# Patient Record
Sex: MALE | Race: WHITE | NOT HISPANIC OR LATINO | Employment: FULL TIME | URBAN - METROPOLITAN AREA
[De-identification: names, ages, dates, MRNs, and addresses within clinical notes are randomized per-mention and may not be internally consistent; named-entity substitution may affect disease eponyms.]

---

## 2017-05-04 ENCOUNTER — ALLSCRIPTS OFFICE VISIT (OUTPATIENT)
Dept: OTHER | Facility: OTHER | Age: 52
End: 2017-05-04

## 2017-05-04 DIAGNOSIS — M25.511 PAIN IN RIGHT SHOULDER: ICD-10-CM

## 2017-05-11 ENCOUNTER — APPOINTMENT (OUTPATIENT)
Dept: PHYSICAL THERAPY | Facility: CLINIC | Age: 52
End: 2017-05-11
Payer: COMMERCIAL

## 2017-05-11 DIAGNOSIS — M25.511 PAIN IN RIGHT SHOULDER: ICD-10-CM

## 2017-05-11 PROCEDURE — 97162 PT EVAL MOD COMPLEX 30 MIN: CPT

## 2017-05-16 ENCOUNTER — APPOINTMENT (OUTPATIENT)
Dept: PHYSICAL THERAPY | Facility: CLINIC | Age: 52
End: 2017-05-16
Payer: COMMERCIAL

## 2017-05-16 PROCEDURE — 97140 MANUAL THERAPY 1/> REGIONS: CPT

## 2017-05-18 ENCOUNTER — APPOINTMENT (OUTPATIENT)
Dept: PHYSICAL THERAPY | Facility: CLINIC | Age: 52
End: 2017-05-18
Payer: COMMERCIAL

## 2017-05-18 PROCEDURE — 97140 MANUAL THERAPY 1/> REGIONS: CPT

## 2017-05-23 ENCOUNTER — APPOINTMENT (OUTPATIENT)
Dept: PHYSICAL THERAPY | Facility: CLINIC | Age: 52
End: 2017-05-23
Payer: COMMERCIAL

## 2017-05-23 PROCEDURE — 97140 MANUAL THERAPY 1/> REGIONS: CPT

## 2017-05-25 ENCOUNTER — APPOINTMENT (OUTPATIENT)
Dept: PHYSICAL THERAPY | Facility: CLINIC | Age: 52
End: 2017-05-25
Payer: COMMERCIAL

## 2017-05-25 PROCEDURE — 97140 MANUAL THERAPY 1/> REGIONS: CPT

## 2017-05-30 ENCOUNTER — APPOINTMENT (OUTPATIENT)
Dept: PHYSICAL THERAPY | Facility: CLINIC | Age: 52
End: 2017-05-30
Payer: COMMERCIAL

## 2017-05-30 PROCEDURE — 97140 MANUAL THERAPY 1/> REGIONS: CPT

## 2017-06-01 ENCOUNTER — APPOINTMENT (OUTPATIENT)
Dept: PHYSICAL THERAPY | Facility: CLINIC | Age: 52
End: 2017-06-01
Payer: COMMERCIAL

## 2017-06-01 PROCEDURE — 97140 MANUAL THERAPY 1/> REGIONS: CPT

## 2017-06-06 ENCOUNTER — APPOINTMENT (OUTPATIENT)
Dept: PHYSICAL THERAPY | Facility: CLINIC | Age: 52
End: 2017-06-06
Payer: COMMERCIAL

## 2017-06-06 PROCEDURE — 97140 MANUAL THERAPY 1/> REGIONS: CPT

## 2017-06-08 ENCOUNTER — APPOINTMENT (OUTPATIENT)
Dept: PHYSICAL THERAPY | Facility: CLINIC | Age: 52
End: 2017-06-08
Payer: COMMERCIAL

## 2017-06-08 PROCEDURE — 97140 MANUAL THERAPY 1/> REGIONS: CPT

## 2017-06-12 ENCOUNTER — APPOINTMENT (OUTPATIENT)
Dept: PHYSICAL THERAPY | Facility: CLINIC | Age: 52
End: 2017-06-12
Payer: COMMERCIAL

## 2017-06-12 PROCEDURE — 97140 MANUAL THERAPY 1/> REGIONS: CPT

## 2017-06-12 PROCEDURE — 97110 THERAPEUTIC EXERCISES: CPT

## 2017-06-15 ENCOUNTER — APPOINTMENT (OUTPATIENT)
Dept: PHYSICAL THERAPY | Facility: CLINIC | Age: 52
End: 2017-06-15
Payer: COMMERCIAL

## 2017-06-15 PROCEDURE — 97140 MANUAL THERAPY 1/> REGIONS: CPT

## 2017-06-19 ENCOUNTER — APPOINTMENT (OUTPATIENT)
Dept: PHYSICAL THERAPY | Facility: CLINIC | Age: 52
End: 2017-06-19
Payer: COMMERCIAL

## 2017-06-19 PROCEDURE — 97140 MANUAL THERAPY 1/> REGIONS: CPT

## 2017-06-22 ENCOUNTER — APPOINTMENT (OUTPATIENT)
Dept: PHYSICAL THERAPY | Facility: CLINIC | Age: 52
End: 2017-06-22
Payer: COMMERCIAL

## 2017-06-22 PROCEDURE — 97140 MANUAL THERAPY 1/> REGIONS: CPT

## 2017-06-26 ENCOUNTER — APPOINTMENT (OUTPATIENT)
Dept: PHYSICAL THERAPY | Facility: CLINIC | Age: 52
End: 2017-06-26
Payer: COMMERCIAL

## 2017-06-26 PROCEDURE — 97110 THERAPEUTIC EXERCISES: CPT

## 2017-06-26 PROCEDURE — 97140 MANUAL THERAPY 1/> REGIONS: CPT

## 2017-06-29 ENCOUNTER — APPOINTMENT (OUTPATIENT)
Dept: PHYSICAL THERAPY | Facility: CLINIC | Age: 52
End: 2017-06-29
Payer: COMMERCIAL

## 2017-06-29 PROCEDURE — 97140 MANUAL THERAPY 1/> REGIONS: CPT

## 2017-07-06 ENCOUNTER — APPOINTMENT (OUTPATIENT)
Dept: PHYSICAL THERAPY | Facility: CLINIC | Age: 52
End: 2017-07-06
Payer: COMMERCIAL

## 2017-07-06 PROCEDURE — 97140 MANUAL THERAPY 1/> REGIONS: CPT

## 2017-07-06 PROCEDURE — 97110 THERAPEUTIC EXERCISES: CPT

## 2017-07-10 ENCOUNTER — APPOINTMENT (OUTPATIENT)
Dept: PHYSICAL THERAPY | Facility: CLINIC | Age: 52
End: 2017-07-10
Payer: COMMERCIAL

## 2017-07-10 PROCEDURE — 97140 MANUAL THERAPY 1/> REGIONS: CPT

## 2017-07-14 ENCOUNTER — APPOINTMENT (OUTPATIENT)
Dept: PHYSICAL THERAPY | Facility: CLINIC | Age: 52
End: 2017-07-14
Payer: COMMERCIAL

## 2017-07-14 PROCEDURE — 97110 THERAPEUTIC EXERCISES: CPT

## 2017-07-14 PROCEDURE — 97140 MANUAL THERAPY 1/> REGIONS: CPT

## 2017-07-17 ENCOUNTER — APPOINTMENT (OUTPATIENT)
Dept: PHYSICAL THERAPY | Facility: CLINIC | Age: 52
End: 2017-07-17
Payer: COMMERCIAL

## 2017-07-20 ENCOUNTER — APPOINTMENT (OUTPATIENT)
Dept: PHYSICAL THERAPY | Facility: CLINIC | Age: 52
End: 2017-07-20
Payer: COMMERCIAL

## 2017-07-24 ENCOUNTER — APPOINTMENT (OUTPATIENT)
Dept: PHYSICAL THERAPY | Facility: CLINIC | Age: 52
End: 2017-07-24
Payer: COMMERCIAL

## 2017-07-27 ENCOUNTER — APPOINTMENT (OUTPATIENT)
Dept: PHYSICAL THERAPY | Facility: CLINIC | Age: 52
End: 2017-07-27
Payer: COMMERCIAL

## 2018-01-13 VITALS
DIASTOLIC BLOOD PRESSURE: 76 MMHG | SYSTOLIC BLOOD PRESSURE: 130 MMHG | BODY MASS INDEX: 26.87 KG/M2 | WEIGHT: 181.4 LBS | HEIGHT: 69 IN | TEMPERATURE: 98.9 F | HEART RATE: 80 BPM

## 2019-02-19 ENCOUNTER — APPOINTMENT (OUTPATIENT)
Dept: URGENT CARE | Age: 54
End: 2019-02-19
Payer: OTHER MISCELLANEOUS

## 2019-02-19 PROCEDURE — G0381 LEV 2 HOSP TYPE B ED VISIT: HCPCS | Performed by: PREVENTIVE MEDICINE

## 2019-02-19 PROCEDURE — 99282 EMERGENCY DEPT VISIT SF MDM: CPT | Performed by: PREVENTIVE MEDICINE

## 2019-02-21 ENCOUNTER — APPOINTMENT (OUTPATIENT)
Dept: URGENT CARE | Age: 54
End: 2019-02-21
Payer: OTHER MISCELLANEOUS

## 2019-02-21 PROCEDURE — 99211 OFF/OP EST MAY X REQ PHY/QHP: CPT | Performed by: PREVENTIVE MEDICINE

## 2019-08-08 ENCOUNTER — OFFICE VISIT (OUTPATIENT)
Dept: INTERNAL MEDICINE CLINIC | Age: 54
End: 2019-08-08
Payer: COMMERCIAL

## 2019-08-08 VITALS
TEMPERATURE: 97.8 F | WEIGHT: 176.8 LBS | OXYGEN SATURATION: 96 % | DIASTOLIC BLOOD PRESSURE: 58 MMHG | HEIGHT: 69 IN | HEART RATE: 72 BPM | SYSTOLIC BLOOD PRESSURE: 120 MMHG | BODY MASS INDEX: 26.19 KG/M2

## 2019-08-08 DIAGNOSIS — K40.90 NON-RECURRENT UNILATERAL INGUINAL HERNIA WITHOUT OBSTRUCTION OR GANGRENE: Primary | ICD-10-CM

## 2019-08-08 PROBLEM — N20.0 KIDNEY STONES: Status: ACTIVE | Noted: 2019-08-08

## 2019-08-08 PROCEDURE — 3008F BODY MASS INDEX DOCD: CPT | Performed by: INTERNAL MEDICINE

## 2019-08-08 PROCEDURE — 99213 OFFICE O/P EST LOW 20 MIN: CPT | Performed by: INTERNAL MEDICINE

## 2019-08-09 PROBLEM — K40.90 NON-RECURRENT UNILATERAL INGUINAL HERNIA WITHOUT OBSTRUCTION OR GANGRENE: Status: ACTIVE | Noted: 2019-08-09

## 2019-08-09 NOTE — PROGRESS NOTES
Assessment/Plan:    Non-recurrent unilateral inguinal hernia without obstruction or gangrene  Patient does a lot a lifting his job and over the last year has noticed increasing discomfort and a lump occasionally in his right groin  Exam is consistent with a hernia will refer him to surgery    Kidney stones  Patient had least 2 kidney stones in his 25s but changed his diet and has not had once       Diagnoses and all orders for this visit:    Non-recurrent unilateral inguinal hernia without obstruction or gangrene  -     Ambulatory referral to General Surgery; Future          Subjective:      Patient ID: Raymond Navarro is a 47 y o  male  Patient thinks he has a hernia for malaise heavy lifting like it evaluated    Groin Pain   Primary symptoms comment: Right groin pain  This is a chronic problem  The current episode started more than 1 month ago  The problem occurs intermittently  The problem has been gradually worsening  The pain is mild  Pertinent negatives include no abdominal pain, chest pain, chills, constipation, coughing, diarrhea, dysuria, fever, flank pain, frequency, headaches, nausea, rash, shortness of breath, sore throat or urgency  Associated symptoms comments: No associated symptoms  The symptoms are aggravated by heavy lifting  He has tried nothing for the symptoms  Review of Systems   Constitutional: Negative for chills, fatigue, fever and unexpected weight change  HENT: Negative for congestion, ear pain, hearing loss, postnasal drip, sinus pressure, sore throat, trouble swallowing and voice change  Eyes: Negative for visual disturbance  Respiratory: Negative for cough, chest tightness, shortness of breath and wheezing  Cardiovascular: Negative for chest pain, palpitations and leg swelling  Gastrointestinal: Negative for abdominal distention, abdominal pain, anal bleeding, blood in stool, constipation, diarrhea and nausea     Endocrine: Negative for cold intolerance, polydipsia, polyphagia and polyuria  Genitourinary: Negative for dysuria, flank pain, frequency, hematuria and urgency  Groin pain on the right   Musculoskeletal: Negative for arthralgias, back pain, gait problem, joint swelling, myalgias and neck pain  Skin: Negative for rash  Allergic/Immunologic: Negative for immunocompromised state  Neurological: Negative for dizziness, syncope, facial asymmetry, weakness, light-headedness, numbness and headaches  Hematological: Negative for adenopathy  Psychiatric/Behavioral: Negative for confusion, sleep disturbance and suicidal ideas  Objective:      /58 (BP Location: Left arm, Patient Position: Sitting)   Pulse 72   Temp 97 8 °F (36 6 °C) (Tympanic)   Ht 5' 9" (1 753 m)   Wt 80 2 kg (176 lb 12 8 oz)   SpO2 96%   BMI 26 11 kg/m²          Physical Exam   Constitutional: He is oriented to person, place, and time  He appears well-developed and well-nourished  No distress  HENT:   Right Ear: External ear normal    Left Ear: External ear normal    Nose: Nose normal    Mouth/Throat: Oropharynx is clear and moist  No oropharyngeal exudate  Eyes: Pupils are equal, round, and reactive to light  EOM are normal    Neck: Normal range of motion  Neck supple  No JVD present  No thyromegaly present  Cardiovascular: Normal rate, regular rhythm, normal heart sounds and intact distal pulses  Exam reveals no gallop  No murmur heard  Pulmonary/Chest: Effort normal and breath sounds normal  No respiratory distress  He has no wheezes  He has no rales  Abdominal: Soft  Bowel sounds are normal  He exhibits no distension and no mass  There is no tenderness  Probable small right inguinal hernia   Musculoskeletal: Normal range of motion  He exhibits no tenderness  Lymphadenopathy:     He has no cervical adenopathy  Neurological: He is alert and oriented to person, place, and time  No cranial nerve deficit   Coordination normal    Skin: No rash noted    Psychiatric: He has a normal mood and affect   His behavior is normal  Judgment and thought content normal

## 2019-08-09 NOTE — PATIENT INSTRUCTIONS
Inguinal Hernia   AMBULATORY CARE:   An inguinal hernia  happens when organs or abdominal tissue push through a weak spot in the abdominal wall  The abdominal wall is made of fat and muscle  It holds the intestines in place  The hernia may contain fluid, tissue from the abdomen, or part of an organ (such as an intestine)  Common signs and symptoms:  A hernia may happen over time or it may happen suddenly  Some movements can make symptoms worse  Examples include when you cough, sneeze, strain to have a bowel movement, lift, or stand for a long time  You may have any of the following:  · A soft lump or bulge in your groin, lower abdomen, or scrotum     · Pain or burning in your abdomen  Seek care immediately if:   · You have severe abdominal pain with nausea and vomiting  · Your abdomen is larger than usual      · Your hernia gets bigger or is purple or blue  · You see blood in your bowel movements  · You feel weak, dizzy, or faint  Contact your healthcare provider if:   · You have a fever  · You have questions or concerns about your condition or care  Treatment for an inguinal hernia  usually involves surgery  Surgery can be done to place the hernia back inside the abdominal wall  Before you have surgery, you may be given medicine or have a manual reduction  Manual reduction means your healthcare provider will use his hands to put firm, steady pressure on your hernia  He will continue until the hernia disappears inside the abdominal wall  You may  need the following:  · NSAIDs , such as ibuprofen, help decrease swelling, pain, and fever  NSAIDs can cause stomach bleeding or kidney problems in certain people  If you take blood thinner medicine, always ask your healthcare provider if NSAIDs are safe for you  Always read the medicine label and follow directions  · Take your medicine as directed    Contact your healthcare provider if you think your medicine is not helping or if you have side effects  Tell him or her if you are allergic to any medicine  Keep a list of the medicines, vitamins, and herbs you take  Include the amounts, and when and why you take them  Bring the list or the pill bottles to follow-up visits  Carry your medicine list with you in case of an emergency  Follow up with your healthcare provider as directed:  Write down your questions so you remember to ask them during your visits  Manage your symptoms and prevent another hernia:   · Do not lift anything heavy  Heavy lifting can make your hernia worse or cause another hernia  Ask your healthcare provider how much is safe for you to lift  · Drink liquids as directed  Liquids may prevent constipation and straining during a bowel movement  Ask how much liquid to drink each day and which liquids are best for you  · Eat foods high in fiber  Fiber may prevent constipation and straining during a bowel movement  Foods that contain fiber include fruits, vegetables, beans, lentils, and whole grains  · Maintain a healthy weight  If you are overweight, weight loss may prevent your hernia from getting worse  It may also prevent another hernia  Talk to your healthcare provider about exercise and how to lose weight safely if you are overweight  · Do not smoke  Nicotine and other chemicals in cigarettes and cigars can weaken the abdominal wall  This may increase your risk for another hernia  Ask your healthcare provider for information if you currently smoke and need help to quit  E-cigarettes or smokeless tobacco still contain nicotine  Talk to your healthcare provider before you use these products  © 2017 [employer] Information is for End User's use only and may not be sold, redistributed or otherwise used for commercial purposes  All illustrations and images included in CareNotes® are the copyrighted property of [employer] , [employer]  or [employer]  The above information is an  only  It is not intended as medical advice for individual conditions or treatments  Talk to your doctor, nurse or pharmacist before following any medical regimen to see if it is safe and effective for you

## 2019-08-09 NOTE — ASSESSMENT & PLAN NOTE
Patient does a lot a lifting his job and over the last year has noticed increasing discomfort and a lump occasionally in his right groin    Exam is consistent with a hernia will refer him to surgery

## 2019-08-27 ENCOUNTER — HOSPITAL ENCOUNTER (OUTPATIENT)
Dept: RADIOLOGY | Age: 54
Discharge: HOME | End: 2019-08-27
Payer: COMMERCIAL

## 2019-08-27 DIAGNOSIS — K40.90 NON-RECURRENT UNILATERAL INGUINAL HERNIA WITHOUT OBSTRUCTION OR GANGRENE: ICD-10-CM

## 2019-08-27 PROCEDURE — 76705 ECHO EXAM OF ABDOMEN: CPT

## 2020-06-22 ENCOUNTER — HOSPITAL ENCOUNTER (EMERGENCY)
Facility: HOSPITAL | Age: 55
Discharge: HOME | End: 2020-06-22
Attending: EMERGENCY MEDICINE
Payer: OTHER MISCELLANEOUS

## 2020-06-22 ENCOUNTER — APPOINTMENT (EMERGENCY)
Dept: RADIOLOGY | Facility: HOSPITAL | Age: 55
End: 2020-06-22
Payer: OTHER MISCELLANEOUS

## 2020-06-22 VITALS
DIASTOLIC BLOOD PRESSURE: 69 MMHG | TEMPERATURE: 98 F | OXYGEN SATURATION: 98 % | RESPIRATION RATE: 18 BRPM | SYSTOLIC BLOOD PRESSURE: 102 MMHG | HEART RATE: 88 BPM

## 2020-06-22 DIAGNOSIS — S60.10XA SUBUNGUAL HEMATOMA OF FINGER OF RIGHT HAND, INITIAL ENCOUNTER: ICD-10-CM

## 2020-06-22 DIAGNOSIS — S62.664B NONDISPLACED FRACTURE OF DISTAL PHALANX OF RIGHT RING FINGER, INITIAL ENCOUNTER FOR OPEN FRACTURE: Primary | ICD-10-CM

## 2020-06-22 PROCEDURE — 99283 EMERGENCY DEPT VISIT LOW MDM: CPT

## 2020-06-22 PROCEDURE — 99284 EMERGENCY DEPT VISIT MOD MDM: CPT | Performed by: EMERGENCY MEDICINE

## 2020-06-22 PROCEDURE — 73140 X-RAY EXAM OF FINGER(S): CPT

## 2020-06-22 RX ORDER — AMOXICILLIN AND CLAVULANATE POTASSIUM 875; 125 MG/1; MG/1
1 TABLET, FILM COATED ORAL EVERY 12 HOURS SCHEDULED
Qty: 14 TABLET | Refills: 0 | Status: SHIPPED | OUTPATIENT
Start: 2020-06-22 | End: 2020-06-29

## 2020-06-22 RX ORDER — GINSENG 100 MG
1 CAPSULE ORAL ONCE
Status: COMPLETED | OUTPATIENT
Start: 2020-06-22 | End: 2020-06-22

## 2020-06-22 RX ADMIN — BACITRACIN ZINC 1 SMALL APPLICATION: 500 OINTMENT TOPICAL at 11:16

## 2020-06-25 ENCOUNTER — OFFICE VISIT (OUTPATIENT)
Dept: OCCUPATIONAL THERAPY | Facility: CLINIC | Age: 55
End: 2020-06-25
Payer: OTHER MISCELLANEOUS

## 2020-06-25 VITALS
DIASTOLIC BLOOD PRESSURE: 76 MMHG | HEART RATE: 77 BPM | SYSTOLIC BLOOD PRESSURE: 130 MMHG | WEIGHT: 176 LBS | HEIGHT: 69 IN | BODY MASS INDEX: 26.07 KG/M2

## 2020-06-25 DIAGNOSIS — S62.664A CLOSED NONDISPLACED FRACTURE OF DISTAL PHALANX OF RIGHT RING FINGER, INITIAL ENCOUNTER: ICD-10-CM

## 2020-06-25 DIAGNOSIS — S62.664A CLOSED NONDISPLACED FRACTURE OF DISTAL PHALANX OF RIGHT RING FINGER, INITIAL ENCOUNTER: Primary | ICD-10-CM

## 2020-06-25 PROCEDURE — 99203 OFFICE O/P NEW LOW 30 MIN: CPT | Performed by: SURGERY

## 2020-06-25 PROCEDURE — 3008F BODY MASS INDEX DOCD: CPT | Performed by: SURGERY

## 2020-06-25 PROCEDURE — 1036F TOBACCO NON-USER: CPT | Performed by: SURGERY

## 2020-06-25 PROCEDURE — L3933 FO W/O JOINTS CF: HCPCS | Performed by: OCCUPATIONAL THERAPIST

## 2020-07-09 ENCOUNTER — APPOINTMENT (OUTPATIENT)
Dept: RADIOLOGY | Facility: AMBULARY SURGERY CENTER | Age: 55
End: 2020-07-09
Attending: SURGERY
Payer: OTHER MISCELLANEOUS

## 2020-07-09 VITALS
DIASTOLIC BLOOD PRESSURE: 77 MMHG | HEART RATE: 83 BPM | BODY MASS INDEX: 26.07 KG/M2 | WEIGHT: 176 LBS | HEIGHT: 69 IN | SYSTOLIC BLOOD PRESSURE: 129 MMHG

## 2020-07-09 DIAGNOSIS — S62.664A CLOSED NONDISPLACED FRACTURE OF DISTAL PHALANX OF RIGHT RING FINGER, INITIAL ENCOUNTER: ICD-10-CM

## 2020-07-09 DIAGNOSIS — S62.664D CLOSED NONDISPLACED FRACTURE OF DISTAL PHALANX OF RIGHT RING FINGER WITH ROUTINE HEALING, SUBSEQUENT ENCOUNTER: Primary | ICD-10-CM

## 2020-07-09 PROCEDURE — 73140 X-RAY EXAM OF FINGER(S): CPT

## 2020-07-09 PROCEDURE — 99213 OFFICE O/P EST LOW 20 MIN: CPT | Performed by: SURGERY

## 2020-07-09 PROCEDURE — 1036F TOBACCO NON-USER: CPT | Performed by: SURGERY

## 2020-07-09 PROCEDURE — 3008F BODY MASS INDEX DOCD: CPT | Performed by: SURGERY

## 2020-07-09 NOTE — PROGRESS NOTES
ASSESSMENT/PLAN:      54 y o  male with a right ring finger distal phalanx (tuft) fracture, DOI 06/22/2020  Repeat right ring finger x-ray's were obtained in the office today and reviewed with Vesna Mansfield  Again it was discussed that his fracture may not heal bone to bone, but should scar in and become asymptomatic  Vesna Mansfield was advised to continue the use of the splint for the next 3 weeks  He may transition to 5lb lifting restriction with regards to his right hand for the next 3 weeks, then he will likely be transition to 15lb weight restriction for 2 weeks then full duty after that, note provided  Follow up in 3 weeks time with repeat right ring finger x-ray's  The patient verbalized understanding of exam findings and treatment plan  We engaged in the shared decision-making process and treatment options were discussed at length with the patient  Surgical and conservative management discussed today along with risks and benefits  Diagnoses and all orders for this visit:    Closed nondisplaced fracture of distal phalanx of right ring finger with routine healing, subsequent encounter  -     XR finger right fourth digit-ring; Future      Follow Up:  Return in about 3 weeks (around 7/30/2020)  To Do Next Visit:  Re-evaluation of current issue and X-rays of the  right  ring finger    ____________________________________________________________________________________________________________________________________________      CHIEF COMPLAINT:  Chief Complaint   Patient presents with    Right Ring Finger - Follow-up       SUBJECTIVE:  Michele Quintana is a 54y o  year old RHD male who presents to the office today for a follow up regarding a right ring finger fracture  Vesna Mansfield has been wearing a cap splint as needed for comfort  He has been working on desensitization therapy at home  He notes his pain has improved  He does get pain if he bumps the finger           I have personally reviewed all the relevant PMH, PSH, SH, FH, Medications and allergies  PAST MEDICAL HISTORY:  History reviewed  No pertinent past medical history  PAST SURGICAL HISTORY:  History reviewed  No pertinent surgical history  FAMILY HISTORY:  Family History   Problem Relation Age of Onset    No Known Problems Mother     No Known Problems Father        SOCIAL HISTORY:  Social History     Tobacco Use    Smoking status: Never Smoker    Smokeless tobacco: Never Used   Substance Use Topics    Alcohol use: Not Currently    Drug use: Never       MEDICATIONS:  No current outpatient medications on file  ALLERGIES:  No Known Allergies    REVIEW OF SYSTEMS:  Review of Systems   Constitutional: Negative for chills, fever and unexpected weight change  HENT: Negative for hearing loss, nosebleeds and sore throat  Eyes: Negative for pain, redness and visual disturbance  Respiratory: Negative for cough, shortness of breath and wheezing  Cardiovascular: Negative for chest pain, palpitations and leg swelling  Gastrointestinal: Negative for abdominal pain, nausea and vomiting  Endocrine: Negative for polydipsia and polyuria  Genitourinary: Negative for difficulty urinating and hematuria  Musculoskeletal: Negative for arthralgias, joint swelling and myalgias  Skin: Negative for rash and wound  Neurological: Negative for dizziness, numbness and headaches  Psychiatric/Behavioral: Negative for decreased concentration, dysphoric mood and suicidal ideas  The patient is not nervous/anxious          VITALS:  Vitals:    07/09/20 1025   BP: 129/77   Pulse: 83       LABS:  HgA1c: No results found for: HGBA1C  BMP: No results found for: GLUCOSE, CALCIUM, NA, K, CO2, CL, BUN, CREATININE    _____________________________________________________  PHYSICAL EXAMINATION:  General: well developed and well nourished, alert, oriented times 3 and appears comfortable  Psychiatric: Normal  HEENT: Normocephalic, Atraumatic Trachea Midline, No torticollis  Pulmonary: No audible wheezing or respiratory distress   Cardiovascular: No pitting edema, 2+ radial pulse   Skin: No masses, erythema, lacerations, fluctation, ulcerations  Neurovascular: Sensation Intact to the Median, Ulnar, Radial Nerve, Motor Intact to the Median, Ulnar, Radial Nerve and Pulses Intact  Musculoskeletal: Normal, except as noted in detailed exam and in HPI        MUSCULOSKELETAL EXAMINATION:    Right ring finger:     No erythema   Mild ecchymosis and edema dorsally  Evidence of nail trephination   No significant tenderness over fracture site    Full composite fist   2+ radial pulse     ___________________________________________________  STUDIES REVIEWED:  I have personally reviewed AP lateral and oblique radiographs of right ring finger which demonstrates persistent comminuted tuft fracture with no overall change in alignment since last x-ray no significant healing          PROCEDURES PERFORMED:  Procedures  No Procedures performed today    _____________________________________________________      Rollene Roxbury    I,:   Ila Ivy am acting as a scribe while in the presence of the attending physician :        I,:   Mesha Kaur MD personally performed the services described in this documentation    as scribed in my presence : n/a

## 2020-07-30 ENCOUNTER — APPOINTMENT (OUTPATIENT)
Dept: RADIOLOGY | Facility: AMBULARY SURGERY CENTER | Age: 55
End: 2020-07-30
Attending: SURGERY
Payer: OTHER MISCELLANEOUS

## 2020-07-30 VITALS
BODY MASS INDEX: 25.77 KG/M2 | WEIGHT: 174 LBS | SYSTOLIC BLOOD PRESSURE: 120 MMHG | DIASTOLIC BLOOD PRESSURE: 74 MMHG | HEART RATE: 73 BPM | HEIGHT: 69 IN

## 2020-07-30 DIAGNOSIS — S62.664D CLOSED NONDISPLACED FRACTURE OF DISTAL PHALANX OF RIGHT RING FINGER WITH ROUTINE HEALING, SUBSEQUENT ENCOUNTER: ICD-10-CM

## 2020-07-30 DIAGNOSIS — S62.664D CLOSED NONDISPLACED FRACTURE OF DISTAL PHALANX OF RIGHT RING FINGER WITH ROUTINE HEALING, SUBSEQUENT ENCOUNTER: Primary | ICD-10-CM

## 2020-07-30 PROCEDURE — 1036F TOBACCO NON-USER: CPT | Performed by: SURGERY

## 2020-07-30 PROCEDURE — 3008F BODY MASS INDEX DOCD: CPT | Performed by: SURGERY

## 2020-07-30 PROCEDURE — 73140 X-RAY EXAM OF FINGER(S): CPT

## 2020-07-30 PROCEDURE — 99213 OFFICE O/P EST LOW 20 MIN: CPT | Performed by: SURGERY

## 2020-07-30 NOTE — PROGRESS NOTES
ASSESSMENT/PLAN:      63-year-old male who presents to the office today for follow up evaluation right ring finger distal tuft fracture, DOI 6/22/20  Again discussed this may not heal bony but should scar is and be asymptomatic  Patient may work with 15 lb weight restrictions for the next 4 weeks and a note was provided for this today  We will likely transition to full duty in 4 weeks  He will follow up in 4 weeks for repeat evaluation  The patient verbalized understanding of exam findings and treatment plan  We engaged in the shared decision-making process and treatment options were discussed at length with the patient  Surgical and conservative management discussed today along with risks and benefits  Diagnoses and all orders for this visit:    Closed nondisplaced fracture of distal phalanx of right ring finger with routine healing, subsequent encounter  -     XR finger right fourth digit-ring; Future        Follow Up:  Return in about 4 weeks (around 8/27/2020)  To Do Next Visit:  Re-evaluation of current issue and X-rays of the  right  ring finger    ____________________________________________________________________________________________________________________________________________      CHIEF COMPLAINT:  Chief Complaint   Patient presents with    Right Ring Finger - Follow-up       SUBJECTIVE:  Aries Gregg is a 54y o  year old RHD male who presents to the office today for follow up evaluation right ring finger tuft fracture  Patient is doing well  He denies any pain at rest  He notes some discomfort with overuse  He states the pain is improving  He denies any numbness or tingling  I have personally reviewed all the relevant PMH, PSH, SH, FH, Medications and allergies  PAST MEDICAL HISTORY:  History reviewed  No pertinent past medical history  PAST SURGICAL HISTORY:  History reviewed  No pertinent surgical history      FAMILY HISTORY:  Family History   Problem Relation Age of Onset    No Known Problems Mother     No Known Problems Father        SOCIAL HISTORY:  Social History     Tobacco Use    Smoking status: Never Smoker    Smokeless tobacco: Never Used   Substance Use Topics    Alcohol use: Not Currently    Drug use: Never       MEDICATIONS:  No current outpatient medications on file  ALLERGIES:  No Known Allergies    REVIEW OF SYSTEMS:  Review of Systems   Constitutional: Negative for chills and fever  HENT: Negative for drooling and sneezing  Eyes: Negative for redness  Respiratory: Negative for cough and wheezing  Gastrointestinal: Negative for nausea and vomiting  Musculoskeletal: Negative for arthralgias, joint swelling and myalgias  Neurological: Negative for weakness and numbness  Psychiatric/Behavioral: Negative for behavioral problems  The patient is not nervous/anxious  VITALS:  Vitals:    07/30/20 1107   BP: 120/74   Pulse: 73       LABS:  HgA1c: No results found for: HGBA1C  BMP: No results found for: GLUCOSE, CALCIUM, NA, K, CO2, CL, BUN, CREATININE    _____________________________________________________  PHYSICAL EXAMINATION:  General: well developed and well nourished, alert, oriented times 3 and appears comfortable  Psychiatric: Normal  HEENT: Normocephalic, Atraumatic Trachea Midline, No torticollis  Pulmonary: No audible wheezing or respiratory distress   Cardiovascular: No pitting edema, 2+ radial pulse   Skin: No masses, erythema, lacerations, fluctation, ulcerations  Neurovascular: Sensation Intact to the Median, Ulnar, Radial Nerve, Motor Intact to the Median, Ulnar, Radial Nerve and Pulses Intact  Musculoskeletal: Normal, except as noted in detailed exam and in HPI        MUSCULOSKELETAL EXAMINATION:  Right ring finger  NTTP distal phalanx  Full fist  Compartments soft  Brisk capillary refill   Mild nail deformities status post trephination      ___________________________________________________  STUDIES REVIEWED:  I have personally reviewed AP lateral and oblique radiographs of right ring finger, which demonstrates persistent nonunion of the tuft fracture no interval displacement          PROCEDURES PERFORMED:  Procedures  No Procedures performed today    _____________________________________________________      Scribe Attestation    I,:   Aleida Ivy MA am acting as a scribe while in the presence of the attending physician :        I,:   Roslyn Beltrán MD personally performed the services described in this documentation    as scribed in my presence :

## 2020-07-30 NOTE — LETTER
July 30, 2020     Patient: Akiko Jacobson   YOB: 1965   Date of Visit: 7/30/2020       To Whom it May Concern:    Steffi Never is under my professional care  He was seen in my office on 7/30/2020  He may return to work with limitations no lifting, pushing, pulling or grasping greater then 15 lbs with right upper extremity for the next4 weeks  He will be re-evalauted in 4 weeks time, at which time he will likley be transitioned to full duty  If you have any questions or concerns, please don't hesitate to call           Sincerely,          Biju Cuevas MD        CC: No Recipients

## 2020-08-27 ENCOUNTER — APPOINTMENT (OUTPATIENT)
Dept: RADIOLOGY | Facility: AMBULARY SURGERY CENTER | Age: 55
End: 2020-08-27
Attending: SURGERY
Payer: OTHER MISCELLANEOUS

## 2020-08-27 VITALS
SYSTOLIC BLOOD PRESSURE: 120 MMHG | WEIGHT: 170 LBS | DIASTOLIC BLOOD PRESSURE: 80 MMHG | BODY MASS INDEX: 25.18 KG/M2 | HEART RATE: 72 BPM | HEIGHT: 69 IN

## 2020-08-27 DIAGNOSIS — S62.664D CLOSED NONDISPLACED FRACTURE OF DISTAL PHALANX OF RIGHT RING FINGER WITH ROUTINE HEALING, SUBSEQUENT ENCOUNTER: Primary | ICD-10-CM

## 2020-08-27 DIAGNOSIS — S62.664D CLOSED NONDISPLACED FRACTURE OF DISTAL PHALANX OF RIGHT RING FINGER WITH ROUTINE HEALING, SUBSEQUENT ENCOUNTER: ICD-10-CM

## 2020-08-27 PROCEDURE — 99213 OFFICE O/P EST LOW 20 MIN: CPT | Performed by: SURGERY

## 2020-08-27 PROCEDURE — 73140 X-RAY EXAM OF FINGER(S): CPT

## 2020-08-27 PROCEDURE — 3008F BODY MASS INDEX DOCD: CPT | Performed by: SURGERY

## 2020-08-27 PROCEDURE — 1036F TOBACCO NON-USER: CPT | Performed by: SURGERY

## 2020-08-27 NOTE — LETTER
August 27, 2020     Patient: Maty York   YOB: 1965   Date of Visit: 8/27/2020       To Whom it May Concern:    Mariya Campbell is under my professional care  He was seen in my office on 8/27/2020  He may return to work full duty with no restrictions  If you have any questions or concerns, please don't hesitate to call           Sincerely,          Dash Cao MD        CC: No Recipients

## 2020-08-27 NOTE — PROGRESS NOTES
ASSESSMENT/PLAN:      66-year-old male who presents to the office today for follow up evaluation right ring finger distal tuft fracture, DOI 6/22/20  Patient doing well  Again discussed may not heal with bone but will scar in and be asymptomatic  He may continue to trim the nail normally  He has no restrictions and a note was provide for this today  He may follow up with me as needed  The patient verbalized understanding of exam findings and treatment plan  We engaged in the shared decision-making process and treatment options were discussed at length with the patient  Surgical and conservative management discussed today along with risks and benefits  Diagnoses and all orders for this visit:    Closed nondisplaced fracture of distal phalanx of right ring finger with routine healing, subsequent encounter  -     XR finger right fourth digit-ring; Future        Follow Up:  Return if symptoms worsen or fail to improve  To Do Next Visit:       ____________________________________________________________________________________________________________________________________________      CHIEF COMPLAINT:  Chief Complaint   Patient presents with    Right Ring Finger - Follow-up       SUBJECTIVE:  Hero Geller is a 54y o  year old RHD male who presents to the office today for follow up evaluation right ring finger distal tuft fracture, DOI 6/22/20  Patient states he is doing well  He denies any pain  He denies any numbness or tingling  He notes intermittent swelling  I have personally reviewed all the relevant PMH, PSH, SH, FH, Medications and allergies  PAST MEDICAL HISTORY:  History reviewed  No pertinent past medical history  PAST SURGICAL HISTORY:  History reviewed  No pertinent surgical history      FAMILY HISTORY:  Family History   Problem Relation Age of Onset    No Known Problems Mother     No Known Problems Father        SOCIAL HISTORY:  Social History     Tobacco Use    Smoking status: Never Smoker    Smokeless tobacco: Never Used   Substance Use Topics    Alcohol use: Not Currently    Drug use: Never       MEDICATIONS:  No current outpatient medications on file  ALLERGIES:  No Known Allergies    REVIEW OF SYSTEMS:  Review of Systems   Constitutional: Negative for chills and fever  HENT: Negative for drooling and sneezing  Eyes: Negative for redness  Respiratory: Negative for cough and wheezing  Gastrointestinal: Negative for nausea and vomiting  Musculoskeletal: Negative for arthralgias, joint swelling and myalgias  Neurological: Negative for weakness and numbness  Psychiatric/Behavioral: Negative for behavioral problems  The patient is not nervous/anxious  VITALS:  Vitals:    08/27/20 1109   BP: 120/80   Pulse: 72       LABS:  HgA1c: No results found for: HGBA1C  BMP: No results found for: GLUCOSE, CALCIUM, NA, K, CO2, CL, BUN, CREATININE    _____________________________________________________  PHYSICAL EXAMINATION:  General: well developed and well nourished, alert, oriented times 3 and appears comfortable  Psychiatric: Normal  HEENT: Normocephalic, Atraumatic Trachea Midline, No torticollis  Pulmonary: No audible wheezing or respiratory distress   Cardiovascular: No pitting edema, 2+ radial pulse   Skin: No masses, erythema, lacerations, fluctation, ulcerations  Neurovascular: Sensation Intact to the Median, Ulnar, Radial Nerve, Motor Intact to the Median, Ulnar, Radial Nerve and Pulses Intact  Musculoskeletal: Normal, except as noted in detailed exam and in HPI        MUSCULOSKELETAL EXAMINATION:  Right ring finger    Full composite fist  Full ROM  Compartments soft  Brisk capillary refill  S/m intact median, radial, and ulnar nerve   No tenderness palpation at the finger tip  No hypersensitivity  Appears to have normal nail growth proximal to site of injury    ___________________________________________________  STUDIES REVIEWED:  I have personally reviewed AP lateral and oblique radiographs of right ring finger which demonstrates 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

## 2020-11-11 VITALS
WEIGHT: 170 LBS | DIASTOLIC BLOOD PRESSURE: 83 MMHG | HEART RATE: 83 BPM | HEIGHT: 69 IN | SYSTOLIC BLOOD PRESSURE: 127 MMHG | BODY MASS INDEX: 25.18 KG/M2

## 2020-11-11 DIAGNOSIS — L03.011 PARONYCHIA OF FINGER OF RIGHT HAND: ICD-10-CM

## 2020-11-11 DIAGNOSIS — S62.664D CLOSED NONDISPLACED FRACTURE OF DISTAL PHALANX OF RIGHT RING FINGER WITH ROUTINE HEALING, SUBSEQUENT ENCOUNTER: Primary | ICD-10-CM

## 2020-11-11 PROCEDURE — 99214 OFFICE O/P EST MOD 30 MIN: CPT | Performed by: SURGERY

## 2020-11-11 RX ORDER — SULFAMETHOXAZOLE AND TRIMETHOPRIM 800; 160 MG/1; MG/1
1 TABLET ORAL EVERY 12 HOURS SCHEDULED
Qty: 14 TABLET | Refills: 0 | Status: SHIPPED | OUTPATIENT
Start: 2020-11-11 | End: 2020-11-18

## 2020-11-18 VITALS — HEART RATE: 79 BPM | DIASTOLIC BLOOD PRESSURE: 82 MMHG | TEMPERATURE: 97.1 F | SYSTOLIC BLOOD PRESSURE: 139 MMHG

## 2020-11-18 DIAGNOSIS — L03.011 PARONYCHIA OF FINGER OF RIGHT HAND: Primary | ICD-10-CM

## 2020-11-18 PROCEDURE — 99213 OFFICE O/P EST LOW 20 MIN: CPT | Performed by: SURGERY

## 2020-11-25 VITALS
WEIGHT: 173 LBS | SYSTOLIC BLOOD PRESSURE: 136 MMHG | HEIGHT: 69 IN | HEART RATE: 85 BPM | BODY MASS INDEX: 25.62 KG/M2 | DIASTOLIC BLOOD PRESSURE: 81 MMHG

## 2020-11-25 DIAGNOSIS — S62.664G: ICD-10-CM

## 2020-11-25 DIAGNOSIS — L03.011 PARONYCHIA OF FINGER OF RIGHT HAND: Primary | ICD-10-CM

## 2020-11-25 PROCEDURE — 1036F TOBACCO NON-USER: CPT | Performed by: SURGERY

## 2020-11-25 PROCEDURE — 3008F BODY MASS INDEX DOCD: CPT | Performed by: SURGERY

## 2020-11-25 PROCEDURE — 99212 OFFICE O/P EST SF 10 MIN: CPT | Performed by: SURGERY

## 2022-07-09 ENCOUNTER — TELEPHONE ENCOUNTER (OUTPATIENT)
Dept: URBAN - METROPOLITAN AREA CLINIC 121 | Facility: CLINIC | Age: 57
End: 2022-07-09

## 2022-07-10 ENCOUNTER — TELEPHONE ENCOUNTER (OUTPATIENT)
Dept: URBAN - METROPOLITAN AREA CLINIC 121 | Facility: CLINIC | Age: 57
End: 2022-07-10

## 2022-07-10 RX ORDER — ERGOCALCIFEROL (VITAMIN D2) 10 MCG
TABLET ORAL
Refills: 0 | Status: ACTIVE | COMMUNITY
Start: 2015-09-11

## 2023-10-16 NOTE — LETTER
July 9, 2020     Patient: Essence Joiner   YOB: 1965   Date of Visit: 7/9/2020       To Whom it May Concern:    Michael Page is under my professional care  He was seen in my office on 7/9/2020  He may return to work with limitations no lifting, pushing, pulling or grasping greater then 5 lbs with right upper extremity for the next 3 weeks  He will be re-evalauted in 3 weeks time, at which time he will likley be transitioned to 15lb restrictions  If you have any questions or concerns, please don't hesitate to call           Sincerely,          Teddy Okeefe MD
22.2

## 2024-07-03 ENCOUNTER — APPOINTMENT (OUTPATIENT)
Dept: URGENT CARE | Age: 59
End: 2024-07-03